# Patient Record
Sex: MALE | Race: WHITE | NOT HISPANIC OR LATINO | Employment: OTHER | URBAN - METROPOLITAN AREA
[De-identification: names, ages, dates, MRNs, and addresses within clinical notes are randomized per-mention and may not be internally consistent; named-entity substitution may affect disease eponyms.]

---

## 2020-06-05 ENCOUNTER — TELEPHONE (OUTPATIENT)
Dept: ORTHOPEDICS | Facility: CLINIC | Age: 55
End: 2020-06-05

## 2020-06-05 NOTE — TELEPHONE ENCOUNTER
----- Message from Charo Aragon MA sent at 6/5/2020  7:53 AM CDT -----  Contact: dawit   Was told to call [facility]   Call back    Right hand middle finger,

## 2020-06-05 NOTE — TELEPHONE ENCOUNTER
Pt scheduled for appointment with Dr. Khanna to address right middle finger injury referred by [facility] on 6/8/20 at 8:40 AM.  Pt agreed to appointment date and time.

## 2022-09-22 DIAGNOSIS — M25.562 PAIN IN BOTH KNEES, UNSPECIFIED CHRONICITY: Primary | ICD-10-CM

## 2022-09-22 DIAGNOSIS — M25.561 PAIN IN BOTH KNEES, UNSPECIFIED CHRONICITY: Primary | ICD-10-CM

## 2024-04-09 ENCOUNTER — TELEPHONE (OUTPATIENT)
Dept: FAMILY MEDICINE | Facility: CLINIC | Age: 59
End: 2024-04-09
Payer: COMMERCIAL

## 2024-04-09 NOTE — TELEPHONE ENCOUNTER
----- Message from Miriam Wu sent at 4/9/2024  7:35 AM CDT -----  Type:  Sooner Appointment Request    Caller is requesting a sooner appointment.  Caller declined first available appointment listed below.  Caller will not accept being placed on the waitlist and is requesting a message be sent to doctor.    Name of Caller:  patient  When is the first available appointment?  N/a  Symptoms:  annual  Would the patient rather a call back or a response via [facility]? call  Best Call Back Number:  [phone] (home)   Additional Information:  patients wife is a patient of [facility]- would like to see her as well

## 2024-04-10 PROBLEM — Z00.00 ANNUAL PHYSICAL EXAM: Status: ACTIVE | Noted: 2024-04-10
